# Patient Record
Sex: MALE | Race: WHITE | NOT HISPANIC OR LATINO | Employment: OTHER | URBAN - NONMETROPOLITAN AREA
[De-identification: names, ages, dates, MRNs, and addresses within clinical notes are randomized per-mention and may not be internally consistent; named-entity substitution may affect disease eponyms.]

---

## 2018-02-12 ENCOUNTER — OFFICE VISIT (OUTPATIENT)
Dept: NEUROSURGERY | Facility: CLINIC | Age: 73
End: 2018-02-12

## 2018-02-12 VITALS
WEIGHT: 225 LBS | HEIGHT: 71 IN | DIASTOLIC BLOOD PRESSURE: 90 MMHG | BODY MASS INDEX: 31.5 KG/M2 | SYSTOLIC BLOOD PRESSURE: 140 MMHG

## 2018-02-12 DIAGNOSIS — E66.3 OVERWEIGHT: ICD-10-CM

## 2018-02-12 DIAGNOSIS — Z78.9 NON-SMOKER: ICD-10-CM

## 2018-02-12 DIAGNOSIS — G95.20 CERVICAL SPINAL CORD COMPRESSION (HCC): Primary | ICD-10-CM

## 2018-02-12 PROCEDURE — 99203 OFFICE O/P NEW LOW 30 MIN: CPT | Performed by: NEUROLOGICAL SURGERY

## 2018-02-12 RX ORDER — DICLOFENAC SODIUM 75 MG/1
TABLET, DELAYED RELEASE ORAL
COMMUNITY
Start: 2018-01-10

## 2018-02-12 RX ORDER — GABAPENTIN 300 MG/1
CAPSULE ORAL
COMMUNITY
Start: 2018-01-05

## 2018-02-12 RX ORDER — RANITIDINE 150 MG/1
TABLET ORAL
COMMUNITY
Start: 2018-01-10

## 2018-02-12 RX ORDER — ATENOLOL 25 MG/1
TABLET ORAL
COMMUNITY
Start: 2018-01-25

## 2018-02-12 RX ORDER — MECLIZINE HYDROCHLORIDE 25 MG/1
TABLET ORAL
COMMUNITY
Start: 2018-01-25

## 2018-02-12 NOTE — PROGRESS NOTES
"    Chief complaint   Chief Complaint   Patient presents with   • Back Pain     pt brought in cd for Dr Santiago to review   • Neck Pain        Subjective     HPI:     This patient is a 72-year-old male who reports that after undergoing a large thoracic spinal surgery, he had a fall and was brought emergently to the operating room for a spinal cord injury with inability to move his left arm in 2014.  He states he underwent an anterior and posterior decompression with instrumented fusion.  He states since this time, he has had baseline numbness in his hands and lower extremities that remains unchanged over the past years.  He states that he has use a power wheelchair since 2014, but is able to ambulate short distances.  They were rolling walker.  He states that he has had generalized weakness and has progressively worsened over the past 4 years.  He states that he also has neck pain is constant in the severity is 7 out of 10.  Activity worsens his symptoms.  Rest relieves his symptoms.  He has had physical therapy and pain management in the past without relief of his symptoms.    Review of Systems     A 12 point review of systems is obtained and is negative except for as described in HPI    Past Medical History:   Diagnosis Date   • Arthritis    • Hypertension      Past Surgical History:   Procedure Laterality Date   • BACK SURGERY       History reviewed. No pertinent family history.  Social History   Substance Use Topics   • Smoking status: Never Smoker   • Smokeless tobacco: None   • Alcohol use No       (Not in a hospital admission)  Allergies:  Review of patient's allergies indicates no known allergies.    Objective      Vital Signs  /90  Ht 180.3 cm (71\")  Wt 102 kg (225 lb)  BMI 31.38 kg/m2    Physical Exam    No acute distress  Awake alert oriented ×3  HEENT atraumatic normocephalic  Neck supple  Abdomen soft, nontender  Extremities no clubbing, edema, cyanosis  Neurologic exam  Cranial nerves II through " XII grossly intact  No pronator drift  Patient moves all extremities 4 out of 5 strength  Sensation is intact to light touch in upper extremities, but is decreased in stocking glove distribution involving the forearms and hands bilaterally and is decreased in bilateral legs from groin down, baseline since 2014  2+ biceps reflex, unable to elicit patellar reflex with previous knee replacements  Positive Ange sign  No long tract signs  Gait is normal    Results Review:     2018 MRI of cervical spine shows C4, 5 focal kyphosis with impingement of the spinal cord behind the C5 vertebral body with a posterior C3, 4, 5, 6 laminectomy with an epidural fluid collection.          Assessment/Plan:     72-year-old male status post anterior cervical discectomy and fusion and posterior laminectomy in 2014 for spinal cord injury with progressive and generalized upper extremity and lower extremity weakness and numbness.  2018 MRI cervical spine shows C4, 5 focal kyphosis with impingement and attenuation of spinal cord behind the C5 vertebral body.  I directly reviewed imaging findings with the patient and I answered all questions.  We will obtain a CT of the cervical spine to assess previous hardware and only quality and follow-up with him after completion of study.  Thank you for this consultation.    I discussed the patients findings and my recommendations with patient    Rajiv Santiago MD  02/12/18  2:46 PM

## 2018-03-07 ENCOUNTER — HOSPITAL ENCOUNTER (OUTPATIENT)
Dept: CT IMAGING | Facility: HOSPITAL | Age: 73
Discharge: HOME | End: 2018-03-07
Attending: NEUROLOGICAL SURGERY | Admitting: NEUROLOGICAL SURGERY

## 2018-03-07 ENCOUNTER — OFFICE VISIT (OUTPATIENT)
Dept: NEUROSURGERY | Facility: CLINIC | Age: 73
End: 2018-03-07

## 2018-03-07 VITALS
HEIGHT: 71 IN | BODY MASS INDEX: 31.5 KG/M2 | DIASTOLIC BLOOD PRESSURE: 90 MMHG | WEIGHT: 225 LBS | SYSTOLIC BLOOD PRESSURE: 140 MMHG

## 2018-03-07 DIAGNOSIS — Z78.9 NON-SMOKER: ICD-10-CM

## 2018-03-07 DIAGNOSIS — G95.20 CERVICAL SPINAL CORD COMPRESSION (HCC): ICD-10-CM

## 2018-03-07 DIAGNOSIS — G95.20 CERVICAL SPINAL CORD COMPRESSION (HCC): Primary | ICD-10-CM

## 2018-03-07 PROCEDURE — 99213 OFFICE O/P EST LOW 20 MIN: CPT | Performed by: NEUROLOGICAL SURGERY

## 2018-03-07 PROCEDURE — 72125 CT NECK SPINE W/O DYE: CPT

## 2018-03-07 NOTE — PROGRESS NOTES
"    Chief complaint   Chief Complaint   Patient presents with   • Follow-up     NECK PAIN        Subjective     HPI:     This patient is a 72-year-old male with progressive cervical myelopathy and myelomalacia who presents for follow-up and discussion after obtaining a CT of his cervical spine.  He reports no new changes in his signs or symptoms since his last visit.    Review of Systems     Past Medical History:   Diagnosis Date   • Arthritis    • Hypertension      Past Surgical History:   Procedure Laterality Date   • BACK SURGERY       No family history on file.  Social History   Substance Use Topics   • Smoking status: Never Smoker   • Smokeless tobacco: None   • Alcohol use No       (Not in a hospital admission)  Allergies:  Oxycodone-acetaminophen    Objective      Vital Signs  /90  Ht 180.3 cm (71\")  Wt 102 kg (225 lb)  BMI 31.38 kg/m2    Physical Exam    No acute distress  Awake alert oriented ×3  HEENT atraumatic normocephalic  Neck supple  Abdomen soft, nontender  Extremities no clubbing, edema, cyanosis  Neurologic exam  Cranial nerves II through XII grossly intact  No pronator drift  Patient moves all extremities 4 out of 5 strength  Sensation is intact to light touch in upper extremities, but is decreased in stocking glove distribution involving the forearms and hands bilaterally and is decreased in bilateral legs from groin down, baseline since 2014  2+ biceps reflex, unable to elicit patellar reflex with previous knee replacements  Positive Ange sign  No long tract signs  Gait is normal      Results Review: Ct Cervical Spine Without Contrast    Result Date: 3/7/2018  History: 72-year-old evaluated for cervical spine compression.  Reference: None.  Technique Routine unenhanced CT cervical spine performed with coronal and sagittal reformatted images provided.  For this CT exam, one or more of the following dose reduction techniques was employed: -automated exposure control -mA and/or kVp " adjustment for patient size -iterative reconstruction   mGy-cm  Findings: Exam is degraded due to body habitus and artifact produced by cervical spine hardware.  There is retrolisthesis of C5 on C6 by approximately 10 mm. C5 serves as point of a cervical kyphosis with an approximately 45 degree angle as seen in the sagittal plane. There is resulting severe spinal stenosis with AP thecal sac dimension appearing reduced to 3 mm.  There are anterior and posterior fusion changes at C5-C7. C6 corpectomy with cage device noted. Bilateral laminectomies at C3, C4, C5, and C6. There is fracture of the left C5 posterior element screw with minimal angulation. There is fracture of the right C7 posterior element screw with distraction of fragments by approximately 2 to 3 mm. There is questionable fracture of the left C7 anterior screw given slight angulation on sagittal reformats. The anterior plate is well apposed to the ventral C5 and C7 cortices.  There is severe spondylosis at C6-C7. There is minimal anterolisthesis of C3 on C4 by approximately 1-2 mm. There is marked degenerative pannus posterior to the odontoid process. This results in spinal stenosis at the level of C1. AP sac diameter reduced to approximately 7-8 mm but no overt cord compression.  Uncovertebral and facet hypertrophy results in severe bony right neuroforaminal narrowing at C2-C3. Severe neuroforaminal narrowing on the left at C3-C4 and on the right at C4-C5. Severe bony neuroforaminal narrowing bilaterally at C6-C7 and C7-T1.  Incidental mucosal thickening in the paranasal sinuses with superimposed fluid level in the right maxillary sinus and sphenoid sinuses.        1. Fractured posterior element screws on the left at C5 and on the right at C7. 2. Cervical kyphosis with apex at C5. There is retrolisthesis of C5 on C6 by approximately 10 mm resulting in severe spinal stenosis. 3. Additional details provided. This report was finalized on 03/07/2018  12:07 by  Tej Regalado, .            Assessment/Plan:     72-year-old male with cervical myelopathy and cervical spinal stenosis and kyphosis.  I directly reviewed imaging findings with the patient and I answered all questions.  Given the complexity of his pathophysiology, I will refer him to [facility] for further evaluation and treatment.    I discussed the patients findings and my recommendations with patient    Rajiv Santiago MD  03/07/18  3:48 PM

## 2020-01-01 ENCOUNTER — OUTSIDE FACILITY SERVICE (OUTPATIENT)
Dept: PULMONOLOGY | Facility: CLINIC | Age: 75
End: 2020-01-01

## 2020-01-01 PROCEDURE — 99223 1ST HOSP IP/OBS HIGH 75: CPT | Performed by: INTERNAL MEDICINE
